# Patient Record
Sex: FEMALE | Race: OTHER | Employment: UNEMPLOYED | URBAN - METROPOLITAN AREA
[De-identification: names, ages, dates, MRNs, and addresses within clinical notes are randomized per-mention and may not be internally consistent; named-entity substitution may affect disease eponyms.]

---

## 2017-05-31 ENCOUNTER — HOSPITAL ENCOUNTER (OUTPATIENT)
Age: 22
Discharge: HOME | End: 2017-05-31
Attending: FAMILY MEDICINE
Payer: MEDICAID

## 2017-05-31 VITALS
TEMPERATURE: 98 F | DIASTOLIC BLOOD PRESSURE: 76 MMHG | WEIGHT: 165 LBS | SYSTOLIC BLOOD PRESSURE: 121 MMHG | RESPIRATION RATE: 16 BRPM | BODY MASS INDEX: 26.52 KG/M2 | OXYGEN SATURATION: 99 % | HEIGHT: 66 IN | HEART RATE: 74 BPM

## 2017-05-31 DIAGNOSIS — J02.0 STREPTOCOCCAL SORE THROAT: Primary | ICD-10-CM

## 2017-05-31 DIAGNOSIS — H66.001 ACUTE SUPPURATIVE OTITIS MEDIA OF RIGHT EAR WITHOUT SPONTANEOUS RUPTURE OF TYMPANIC MEMBRANE, RECURRENCE NOT SPECIFIED: ICD-10-CM

## 2017-05-31 PROCEDURE — 99204 OFFICE O/P NEW MOD 45 MIN: CPT

## 2017-05-31 PROCEDURE — 99203 OFFICE O/P NEW LOW 30 MIN: CPT

## 2017-05-31 PROCEDURE — 87430 STREP A AG IA: CPT | Performed by: FAMILY MEDICINE

## 2017-05-31 RX ORDER — CEPHALEXIN 500 MG/1
500 CAPSULE ORAL 3 TIMES DAILY
Qty: 30 CAPSULE | Refills: 0 | Status: SHIPPED | OUTPATIENT
Start: 2017-05-31 | End: 2017-06-03

## 2017-05-31 RX ORDER — IBUPROFEN 200 MG
200 TABLET ORAL EVERY 6 HOURS PRN
COMMUNITY

## 2017-05-31 NOTE — ED INITIAL ASSESSMENT (HPI)
Sore throat started on Monday. Feeling feverish last night and very tired. Right ear pain started last night. Could not sleep.

## 2017-05-31 NOTE — ED PROVIDER NOTES
Patient presents with:  Ear Pain  Sore Throat    HPI:     Marlin Schuler is a 25year old female who presents for evaluation of a chief complaint of sore throat.  Associated symptoms include myalgias, headahce, chills, bilateral ear pain and pain while swal tenderness/mass/nodules  Lungs: clear to auscultation bilaterally  Heart: S1, S2 normal, no murmur, click, rub or gallop, regular rate and rhythm  Abdomen: soft, non-tender; bowel sounds normal; no masses,  no organomegaly  Skin: Skin color, texture, turgo

## 2017-06-03 ENCOUNTER — HOSPITAL ENCOUNTER (OUTPATIENT)
Age: 22
Discharge: HOME | End: 2017-06-03
Attending: EMERGENCY MEDICINE

## 2017-06-03 VITALS
OXYGEN SATURATION: 99 % | SYSTOLIC BLOOD PRESSURE: 121 MMHG | WEIGHT: 165 LBS | RESPIRATION RATE: 16 BRPM | TEMPERATURE: 98 F | DIASTOLIC BLOOD PRESSURE: 81 MMHG | BODY MASS INDEX: 26.52 KG/M2 | HEIGHT: 66 IN | HEART RATE: 75 BPM

## 2017-06-03 DIAGNOSIS — H10.33 ACUTE CONJUNCTIVITIS OF BOTH EYES, UNSPECIFIED ACUTE CONJUNCTIVITIS TYPE: ICD-10-CM

## 2017-06-03 DIAGNOSIS — J02.0 STREPTOCOCCAL SORE THROAT: ICD-10-CM

## 2017-06-03 DIAGNOSIS — H66.001 ACUTE SUPPURATIVE OTITIS MEDIA OF RIGHT EAR WITHOUT SPONTANEOUS RUPTURE OF TYMPANIC MEMBRANE, RECURRENCE NOT SPECIFIED: Primary | ICD-10-CM

## 2017-06-03 PROCEDURE — 99213 OFFICE O/P EST LOW 20 MIN: CPT

## 2017-06-03 PROCEDURE — 99214 OFFICE O/P EST MOD 30 MIN: CPT

## 2017-06-03 RX ORDER — AMOXICILLIN AND CLAVULANATE POTASSIUM 875; 125 MG/1; MG/1
1 TABLET, FILM COATED ORAL 2 TIMES DAILY
Qty: 20 TABLET | Refills: 0 | Status: SHIPPED | OUTPATIENT
Start: 2017-06-03 | End: 2017-06-13

## 2017-06-03 RX ORDER — POLYMYXIN B SULFATE AND TRIMETHOPRIM 1; 10000 MG/ML; [USP'U]/ML
1 SOLUTION OPHTHALMIC
Qty: 10 ML | Refills: 0 | Status: SHIPPED | OUTPATIENT
Start: 2017-06-03 | End: 2017-06-08

## 2017-06-03 NOTE — ED INITIAL ASSESSMENT (HPI)
Still having sore throat, and left ear has started hurting. Right ear feels much better. Both eye started with drainage and were crusted over this morning.

## 2017-06-03 NOTE — ED PROVIDER NOTES
Patient presents with:  Ear Pain  Eye Problem    HPI:     Richardson Diaz is a 25year old female who presents with chief complaint of sore throat, ear pain, congestion, eye discharge.   Was seen here 4 days ago and dx with strep throat and R AOM, was placed rhonchi  Heart: regular rate and rhythm  Extremities: extremities normal, atraumatic, no cyanosis or edema  Pulses: 2+ and symmetric  Skin:  No rashes, lesions or abrasions.     Diagnostics:     N/a    MDM:     Persisting otitis media now with conjunctiviti

## 2018-03-26 ENCOUNTER — IMAGING SERVICES (OUTPATIENT)
Dept: OTHER | Age: 23
End: 2018-03-26

## 2018-03-26 ENCOUNTER — CHARTING TRANS (OUTPATIENT)
Dept: OTHER | Age: 23
End: 2018-03-26

## 2018-03-27 ENCOUNTER — BH HISTORICAL (OUTPATIENT)
Dept: OTHER | Age: 23
End: 2018-03-27

## 2018-04-20 ENCOUNTER — LAB SERVICES (OUTPATIENT)
Dept: OTHER | Age: 23
End: 2018-04-20

## 2018-04-20 ENCOUNTER — CHARTING TRANS (OUTPATIENT)
Dept: OTHER | Age: 23
End: 2018-04-20

## 2018-04-20 LAB
DIFFERENTIAL TYPE: NORMAL
HEMATOCRIT: 36.8 % (ref 34–45)
HEMOGLOBIN: 12.3 G/DL (ref 11.2–15.7)
LYMPH PERCENT: 32.7 % (ref 20.5–51.1)
LYMPHOCYTE ABSOLUTE #: 2.3 10*3/UL (ref 1.2–3.4)
MEAN CORPUSCULAR HGB CONCENTRATION: 33.4 % (ref 32–36)
MEAN CORPUSCULAR HGB: 29.3 PG (ref 27–34)
MEAN CORPUSCULAR VOLUME: 87.6 FL (ref 79–95)
MEAN PLATELET VOLUME: 10.3 FL (ref 8.6–12.4)
MIXED %: 12.1 % (ref 4.3–12.9)
MIXED ABSOLUTE #: 0.9 10*3/UL (ref 0.2–0.9)
NEUTROPHIL ABSOLUTE #: 3.9 10*3/UL (ref 1.4–6.5)
NEUTROPHIL PERCENT: 55.2 % (ref 34–73.5)
PLATELET COUNT: 202 10*3/UL (ref 150–400)
RED BLOOD CELL COUNT: 4.2 10*6/UL (ref 3.7–5.2)
RED CELL DISTRIBUTION WIDTH: 12.9 % (ref 11.3–14.8)
WHITE BLOOD CELL COUNT: 7.1 10*3/UL (ref 4–10)

## 2018-05-02 ENCOUNTER — CHARTING TRANS (OUTPATIENT)
Dept: OTHER | Age: 23
End: 2018-05-02

## 2018-05-15 ENCOUNTER — CHARTING TRANS (OUTPATIENT)
Dept: OTHER | Age: 23
End: 2018-05-15

## 2018-05-24 ENCOUNTER — BH HISTORICAL (OUTPATIENT)
Dept: OTHER | Age: 23
End: 2018-05-24

## 2018-05-25 ENCOUNTER — CHARTING TRANS (OUTPATIENT)
Dept: OTHER | Age: 23
End: 2018-05-25

## 2018-05-29 ENCOUNTER — CHARTING TRANS (OUTPATIENT)
Dept: OTHER | Age: 23
End: 2018-05-29

## 2018-06-06 ENCOUNTER — BH HISTORICAL (OUTPATIENT)
Dept: OTHER | Age: 23
End: 2018-06-06

## 2018-06-20 ENCOUNTER — BH HISTORICAL (OUTPATIENT)
Dept: OTHER | Age: 23
End: 2018-06-20

## 2018-07-12 ENCOUNTER — CHARTING TRANS (OUTPATIENT)
Dept: OTHER | Age: 23
End: 2018-07-12

## 2018-07-25 ENCOUNTER — BH HISTORICAL (OUTPATIENT)
Dept: OTHER | Age: 23
End: 2018-07-25

## 2018-07-29 ENCOUNTER — BH HISTORICAL (OUTPATIENT)
Dept: OTHER | Age: 23
End: 2018-07-29

## 2018-08-01 ENCOUNTER — BH HISTORICAL (OUTPATIENT)
Dept: OTHER | Age: 23
End: 2018-08-01

## 2018-08-07 ENCOUNTER — BH HISTORICAL (OUTPATIENT)
Dept: OTHER | Age: 23
End: 2018-08-07

## 2018-09-04 ENCOUNTER — BH HISTORICAL (OUTPATIENT)
Dept: OTHER | Age: 23
End: 2018-09-04

## 2019-03-06 VITALS
BODY MASS INDEX: 26.84 KG/M2 | HEIGHT: 66 IN | DIASTOLIC BLOOD PRESSURE: 70 MMHG | TEMPERATURE: 98.7 F | HEART RATE: 60 BPM | SYSTOLIC BLOOD PRESSURE: 116 MMHG | WEIGHT: 167 LBS | RESPIRATION RATE: 18 BRPM

## 2019-03-06 VITALS
HEIGHT: 66 IN | SYSTOLIC BLOOD PRESSURE: 110 MMHG | DIASTOLIC BLOOD PRESSURE: 60 MMHG | WEIGHT: 167 LBS | BODY MASS INDEX: 26.84 KG/M2

## (undated) NOTE — ED AVS SNAPSHOT
[facility] in [facility] [address] [address]    Phone:  [phone]    Fax:  [phone]           Gillian Molina   MRN: [id number]    Department:  [facility] in [place]   Date of Visit:  5/31/2017           Diagn Laisha Shaffer [address], [address]   [phone]       To Check ER Wait Times:  TEXT 'ERwait' to 81953      Click [email or web address]      Or call [phone]    If you have any problems with your follow-up, please call our  at [phone] I have read and understand the instructions given to me by my caregivers. 24-Hour Pharmacies        Pharmacy Address Phone Number   [phone] ([address]) Javier Vásquez visit,  view other health information, and more. To sign up or find more information, go to [email or web address] and click on the Sign Up Now link in the [employer] box.      Enter your [email or web address] Activation Code exactly as it appears below along with yo

## (undated) NOTE — LETTER
[facility] IN [place]  [address] [address]  Dept: [phone]  Dept Fax: [phone]         May 31, 2017    Patient: Harjinder Knutson   YOB: 1995   Date of Visit: 5/31/2017       To Whom It May Concern:     Froylan Baar

## (undated) NOTE — ED AVS SNAPSHOT
[facility] in [place] [address] [address]    Phone:  [phone]    Fax:  [phone]           Laurel Laird   MRN: [id number]    Department:  [facility] in [place]   Date of Visit:  6/3/2017           Diagno along with 600-800 mg of ibuprofen eery 6-8 hours as needed for pain.      Discharge References/Attachments     OTITIS MEDIA, ANTIBIOTIC TREATMENT (ADULT) (ENGLISH)    CONJUNCTIVITIS, NON-SPECIFIC (ENGLISH)    PHARYNGITIS, STREP (CONFIRMED) (ENGLISH)      D care or specialist physician will see patients referred from the [facility]. Follow-up care is at the discretion of that Physician.     IF THERE IS ANY CHANGE OR WORSENING OF YOUR CONDITION, CALL YOUR PRIMARY CARE PHYSICIAN AT ONCE OR GO TO THE harming yourself, contact [facility] at [phone]. - If you don’t have insurance, Paulino Block has partnered with [facility] to help you get signed up for insurance coverage.   [facility]